# Patient Record
Sex: FEMALE | Race: OTHER | HISPANIC OR LATINO | URBAN - METROPOLITAN AREA
[De-identification: names, ages, dates, MRNs, and addresses within clinical notes are randomized per-mention and may not be internally consistent; named-entity substitution may affect disease eponyms.]

---

## 2022-05-10 ENCOUNTER — TELEPHONE ENCOUNTER (OUTPATIENT)
Dept: URBAN - METROPOLITAN AREA CLINIC 9 | Facility: CLINIC | Age: 72
End: 2022-05-10

## 2022-07-09 ENCOUNTER — TELEPHONE ENCOUNTER (OUTPATIENT)
Dept: URBAN - METROPOLITAN AREA CLINIC 121 | Facility: CLINIC | Age: 72
End: 2022-07-09

## 2022-07-09 RX ORDER — OMEPRAZOLE 20 MG/1
CAPSULE, DELAYED RELEASE ORAL ONCE A DAY
Refills: 1 | OUTPATIENT
Start: 2011-04-20 | End: 2011-04-20

## 2022-07-09 RX ORDER — OMEPRAZOLE 20 MG/1
CAPSULE, DELAYED RELEASE ORAL ONCE A DAY
Refills: 6 | OUTPATIENT
Start: 2010-01-26 | End: 2011-04-20

## 2022-07-09 RX ORDER — OMEPRAZOLE 20 MG/1
TABLET, DELAYED RELEASE ORAL
Refills: 0 | OUTPATIENT
Start: 2010-01-13 | End: 2010-07-15

## 2022-07-09 RX ORDER — METOCLOPRAMIDE 10 MG/1
TABLET ORAL
Refills: 0 | OUTPATIENT
Start: 2010-01-13 | End: 2010-07-15

## 2022-07-10 ENCOUNTER — TELEPHONE ENCOUNTER (OUTPATIENT)
Dept: URBAN - METROPOLITAN AREA CLINIC 121 | Facility: CLINIC | Age: 72
End: 2022-07-10

## 2022-07-10 RX ORDER — ALBUTEROL SULFATE 0.63 MG/3ML
SOLUTION RESPIRATORY (INHALATION)
Refills: 0 | Status: ACTIVE | COMMUNITY
Start: 2010-01-13

## 2022-07-10 RX ORDER — ALBUTEROL SULFATE 90 UG/1
AEROSOL, METERED RESPIRATORY (INHALATION)
Refills: 0 | Status: ACTIVE | COMMUNITY
Start: 2010-01-13

## 2022-07-10 RX ORDER — LITHIUM CARBONATE 450 MG/1
TABLET ORAL
Refills: 0 | Status: ACTIVE | COMMUNITY
Start: 2010-01-13

## 2022-07-10 RX ORDER — LORATADINE 5 MG
17 G IN 8 OZ WATER PO Q DAILY PRN TABLET,CHEWABLE ORAL TAKE AS DIRECTED
Refills: 2 | Status: ACTIVE | COMMUNITY
Start: 2011-02-08

## 2022-07-10 RX ORDER — MONTELUKAST SODIUM 10 MG/1
TABLET, FILM COATED ORAL
Refills: 0 | Status: ACTIVE | COMMUNITY
Start: 2010-01-13

## 2022-07-10 RX ORDER — OMEPRAZOLE 20 MG/1
CAPSULE, DELAYED RELEASE ORAL ONCE A DAY
Refills: 1 | Status: ACTIVE | COMMUNITY
Start: 2011-04-20

## 2022-07-10 RX ORDER — DOCUSATE SODIUM 100 MG/1
CAPSULE ORAL TWICE A DAY
Refills: 3 | Status: ACTIVE | COMMUNITY
Start: 2011-02-08

## 2022-07-30 ENCOUNTER — TELEPHONE ENCOUNTER (OUTPATIENT)
Age: 72
End: 2022-07-30

## 2022-07-31 ENCOUNTER — TELEPHONE ENCOUNTER (OUTPATIENT)
Age: 72
End: 2022-07-31

## 2022-09-19 ENCOUNTER — OFFICE VISIT (OUTPATIENT)
Dept: URBAN - METROPOLITAN AREA CLINIC 63 | Facility: CLINIC | Age: 72
End: 2022-09-19
Payer: COMMERCIAL

## 2022-09-19 ENCOUNTER — WEB ENCOUNTER (OUTPATIENT)
Dept: URBAN - METROPOLITAN AREA CLINIC 63 | Facility: CLINIC | Age: 72
End: 2022-09-19

## 2022-09-19 VITALS
HEIGHT: 61 IN | TEMPERATURE: 96.8 F | HEART RATE: 79 BPM | OXYGEN SATURATION: 97 % | DIASTOLIC BLOOD PRESSURE: 80 MMHG | WEIGHT: 163 LBS | BODY MASS INDEX: 30.78 KG/M2 | SYSTOLIC BLOOD PRESSURE: 130 MMHG

## 2022-09-19 DIAGNOSIS — Z12.11 SCREEN FOR COLON CANCER: ICD-10-CM

## 2022-09-19 DIAGNOSIS — R13.19 ESOPHAGEAL DYSPHAGIA: ICD-10-CM

## 2022-09-19 PROCEDURE — 99204 OFFICE O/P NEW MOD 45 MIN: CPT | Performed by: NURSE PRACTITIONER

## 2022-09-19 RX ORDER — BUDESONIDE AND FORMOTEROL FUMARATE DIHYDRATE 160; 4.5 UG/1; UG/1
2 PUFFS AEROSOL RESPIRATORY (INHALATION) TWICE A DAY
Status: ACTIVE | COMMUNITY

## 2022-09-19 RX ORDER — LITHIUM CARBONATE 450 MG/1
TABLET ORAL
Refills: 0 | Status: ACTIVE | COMMUNITY
Start: 2010-01-13

## 2022-09-19 RX ORDER — ONDANSETRON HYDROCHLORIDE 4 MG/1
1 TABLET TABLET, FILM COATED ORAL
Qty: 2 | Refills: 0 | OUTPATIENT
Start: 2022-09-19

## 2022-09-19 RX ORDER — RIVAROXABAN 20 MG/1
1 TABLET WITH FOOD TABLET, FILM COATED ORAL ONCE A DAY
Status: ACTIVE | COMMUNITY

## 2022-09-19 RX ORDER — ALBUTEROL SULFATE 0.63 MG/3ML
SOLUTION RESPIRATORY (INHALATION)
Refills: 0 | Status: ACTIVE | COMMUNITY
Start: 2010-01-13

## 2022-09-19 RX ORDER — DOCUSATE SODIUM 100 MG/1
CAPSULE ORAL TWICE A DAY
Refills: 3 | Status: ACTIVE | COMMUNITY
Start: 2011-02-08

## 2022-09-19 RX ORDER — POLYETHYLENE GLYCOL 3350, SODIUM SULFATE ANHYDROUS, SODIUM BICARBONATE, SODIUM CHLORIDE, POTASSIUM CHLORIDE 236; 22.74; 6.74; 5.86; 2.97 G/4L; G/4L; G/4L; G/4L; G/4L
AS DIRECTED POWDER, FOR SOLUTION ORAL
Qty: 4000 MILLILITER | Refills: 0 | OUTPATIENT
Start: 2022-09-19 | End: 2022-09-20

## 2022-09-19 RX ORDER — NYSTATIN 100000 [USP'U]/ML
4 ML SUSPENSION ORAL
Status: ACTIVE | COMMUNITY

## 2022-09-19 RX ORDER — OMEPRAZOLE 40 MG/1
1 CAPSULE 30 MINUTES BEFORE MORNING MEAL CAPSULE, DELAYED RELEASE ORAL ONCE A DAY
Refills: 1 | Status: ACTIVE | COMMUNITY
Start: 2011-04-20

## 2022-09-19 RX ORDER — TELMISARTAN 80 MG/1
1 TABLET TABLET ORAL ONCE A DAY
Status: ACTIVE | COMMUNITY

## 2022-09-19 NOTE — HPI-PREVIOUS PROCEDURES
EGD (5/2011) showed a large sliding type HH (6-8 cm) and h pylori neg gastritis and un remarkable duodenum and biopsies.Mild grade 2 esophagitis with intestinal metaplasia  (barretts- short segments). EGD/Colonoscopy (2/10) for evaluation of chronic reflux and intermittent rectal bleeding.Her EGD was significant for barretts negative grade 2 esophagitis and mild h pylori negative gastritis and a 5 cm hiatal hernia .Mild diverticulosis and mild proctitis with un remarkable biopsies on her colonoscopy.

## 2022-09-20 ENCOUNTER — TELEPHONE ENCOUNTER (OUTPATIENT)
Dept: URBAN - METROPOLITAN AREA CLINIC 63 | Facility: CLINIC | Age: 72
End: 2022-09-20

## 2022-09-26 ENCOUNTER — LAB OUTSIDE AN ENCOUNTER (OUTPATIENT)
Dept: URBAN - METROPOLITAN AREA CLINIC 63 | Facility: CLINIC | Age: 72
End: 2022-09-26

## 2022-10-11 ENCOUNTER — TELEPHONE ENCOUNTER (OUTPATIENT)
Dept: URBAN - METROPOLITAN AREA CLINIC 63 | Facility: CLINIC | Age: 72
End: 2022-10-11

## 2023-01-24 ENCOUNTER — TELEPHONE ENCOUNTER (OUTPATIENT)
Dept: URBAN - METROPOLITAN AREA CLINIC 63 | Facility: CLINIC | Age: 73
End: 2023-01-24

## 2023-01-27 ENCOUNTER — OFFICE VISIT (OUTPATIENT)
Dept: URBAN - METROPOLITAN AREA MEDICAL CENTER 14 | Facility: MEDICAL CENTER | Age: 73
End: 2023-01-27
Payer: COMMERCIAL

## 2023-01-27 DIAGNOSIS — K20.90 ESOPHAGITIS: ICD-10-CM

## 2023-01-27 DIAGNOSIS — K57.30 DIVERTCULOSIS OF LG INT W/O PERFORATION OR ABSCESS W/O BLEEDING: ICD-10-CM

## 2023-01-27 DIAGNOSIS — R13.19 ESOPHAGEAL DYSPHAGIA: ICD-10-CM

## 2023-01-27 DIAGNOSIS — K29.70 GASTRITIS: ICD-10-CM

## 2023-01-27 DIAGNOSIS — K21.9 ACID REFLUX: ICD-10-CM

## 2023-01-27 DIAGNOSIS — K64.3 GRADE IV HEMORRHOIDS: ICD-10-CM

## 2023-01-27 DIAGNOSIS — Z12.11 SCREEN FOR COLON CANCER: ICD-10-CM

## 2023-01-27 PROCEDURE — 45380 COLONOSCOPY AND BIOPSY: CPT | Performed by: INTERNAL MEDICINE

## 2023-01-27 PROCEDURE — 43239 EGD BIOPSY SINGLE/MULTIPLE: CPT | Performed by: INTERNAL MEDICINE

## 2023-01-30 PROBLEM — 4556007 GASTRITIS: Status: ACTIVE | Noted: 2023-01-30

## 2023-01-30 PROBLEM — 698065002 ACID REFLUX: Status: ACTIVE | Noted: 2023-01-30

## 2023-01-30 PROBLEM — 398050005 DIVERTICULAR DISEASE OF COLON: Status: ACTIVE | Noted: 2023-01-30

## 2023-01-31 ENCOUNTER — TELEPHONE ENCOUNTER (OUTPATIENT)
Dept: URBAN - METROPOLITAN AREA CLINIC 63 | Facility: CLINIC | Age: 73
End: 2023-01-31

## 2023-02-09 ENCOUNTER — LAB OUTSIDE AN ENCOUNTER (OUTPATIENT)
Dept: URBAN - METROPOLITAN AREA CLINIC 63 | Facility: CLINIC | Age: 73
End: 2023-02-09

## 2023-02-09 ENCOUNTER — TELEPHONE ENCOUNTER (OUTPATIENT)
Dept: URBAN - METROPOLITAN AREA CLINIC 63 | Facility: CLINIC | Age: 73
End: 2023-02-09

## 2023-02-15 ENCOUNTER — WEB ENCOUNTER (OUTPATIENT)
Dept: URBAN - METROPOLITAN AREA CLINIC 63 | Facility: CLINIC | Age: 73
End: 2023-02-15

## 2023-02-15 ENCOUNTER — OFFICE VISIT (OUTPATIENT)
Dept: URBAN - METROPOLITAN AREA CLINIC 63 | Facility: CLINIC | Age: 73
End: 2023-02-15
Payer: COMMERCIAL

## 2023-02-15 ENCOUNTER — LAB OUTSIDE AN ENCOUNTER (OUTPATIENT)
Dept: URBAN - METROPOLITAN AREA CLINIC 63 | Facility: CLINIC | Age: 73
End: 2023-02-15

## 2023-02-15 VITALS
TEMPERATURE: 97.6 F | WEIGHT: 169 LBS | BODY MASS INDEX: 31.91 KG/M2 | HEART RATE: 75 BPM | HEIGHT: 61 IN | OXYGEN SATURATION: 97 % | DIASTOLIC BLOOD PRESSURE: 70 MMHG | SYSTOLIC BLOOD PRESSURE: 130 MMHG

## 2023-02-15 DIAGNOSIS — R13.19 ESOPHAGEAL DYSPHAGIA: ICD-10-CM

## 2023-02-15 DIAGNOSIS — R93.5 ABNORMAL FINDINGS ON DIAGNOSTIC IMAGING OF OTHER ABDOMINAL REGIONS, INCLUDING RETROPERITONEUM: ICD-10-CM

## 2023-02-15 DIAGNOSIS — Z12.11 SCREEN FOR COLON CANCER: ICD-10-CM

## 2023-02-15 PROCEDURE — 99214 OFFICE O/P EST MOD 30 MIN: CPT | Performed by: NURSE PRACTITIONER

## 2023-02-15 RX ORDER — NYSTATIN 100000 [USP'U]/ML
4 ML SUSPENSION ORAL
Status: ACTIVE | COMMUNITY

## 2023-02-15 RX ORDER — DOCUSATE SODIUM 100 MG/1
CAPSULE ORAL TWICE A DAY
Refills: 3 | Status: ACTIVE | COMMUNITY
Start: 2011-02-08

## 2023-02-15 RX ORDER — OMEPRAZOLE 40 MG/1
1 CAPSULE 30 MINUTES BEFORE MORNING MEAL CAPSULE, DELAYED RELEASE ORAL ONCE A DAY
Refills: 1 | Status: ACTIVE | COMMUNITY
Start: 2011-04-20

## 2023-02-15 RX ORDER — TELMISARTAN 80 MG/1
1 TABLET TABLET ORAL ONCE A DAY
Status: ACTIVE | COMMUNITY

## 2023-02-15 RX ORDER — ALBUTEROL SULFATE 0.63 MG/3ML
SOLUTION RESPIRATORY (INHALATION)
Refills: 0 | Status: ACTIVE | COMMUNITY
Start: 2010-01-13

## 2023-02-15 RX ORDER — BUDESONIDE AND FORMOTEROL FUMARATE DIHYDRATE 160; 4.5 UG/1; UG/1
2 PUFFS AEROSOL RESPIRATORY (INHALATION) TWICE A DAY
Status: ACTIVE | COMMUNITY

## 2023-02-15 RX ORDER — LITHIUM CARBONATE 450 MG/1
TABLET ORAL
Refills: 0 | Status: ACTIVE | COMMUNITY
Start: 2010-01-13

## 2023-02-15 RX ORDER — RIVAROXABAN 20 MG/1
1 TABLET WITH FOOD TABLET, FILM COATED ORAL ONCE A DAY
Status: ACTIVE | COMMUNITY

## 2023-02-15 NOTE — HPI-PREVIOUS PROCEDURES
EGD January 2023 LA grade a esophagitis biopsies, no evidence of clinically significant Pittman's, tortuous esophagus, 6 cm sliding hiatal hernia, mild erosive gastritis biopsies, normal duodenum with distal duodenal diverticulum.    Colonoscopy January 2023 grade 3-4 external hemorrhoids possible thrombosis.  No inflammation.  Shallow intubation of the terminal ileum.  No polyps noted.  No inflammation noted in the entire colon.  Random biopsies Relvar prescribed colitis.  Good prep.  Pancolonic diverticulosis,  EGD (5/2011) showed a large sliding type HH (6-8 cm) and h pylori neg gastritis and un remarkable duodenum and biopsies.Mild grade 2 esophagitis with intestinal metaplasia  (barretts- short segments). EGD/Colonoscopy (2/10) for evaluation of chronic reflux and intermittent rectal bleeding.Her EGD was significant for barretts negative grade 2 esophagitis and mild h pylori negative gastritis and a 5 cm hiatal hernia .Mild diverticulosis and mild proctitis with un remarkable biopsies on her colonoscopy.

## 2023-02-15 NOTE — HPI-PREVIOUS IMAGING
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  Limited as patient could not lay prone due to back pain.  No obstruction.  Recommend CT chest to better assessment and exclude acute on chronic volvulus /stranding.  Delayed passage of the 30 mm tablet at the gastroesophageal junction.

## 2023-02-15 NOTE — HPI-TODAY'S VISIT:
72-year-old female here in f/u from egd and colonoscopy. No pathology available. Vietnamese translation by her daughter. I offered a  from office. Dysphagia worse with dry foods but occasionally liquids. She had a barium swallow which noted ?volvulus , large hiatal hernia, and delay of passage of tablet. EGD noted large hiatal hernia Taking 40mg omeprazole daily. No weight loss or loss of appetite. Admits to mainly eating soft foods. Colonoscoopy 2022 as below. She has no noticed any constipation, diarrhea, blood in stool. She takes xarelto for history of DVT Hx of COPD requiring BID rescue inhaler.

## 2023-04-13 ENCOUNTER — WEB ENCOUNTER (OUTPATIENT)
Dept: URBAN - METROPOLITAN AREA CLINIC 63 | Facility: CLINIC | Age: 73
End: 2023-04-13

## 2023-04-13 ENCOUNTER — OFFICE VISIT (OUTPATIENT)
Dept: URBAN - METROPOLITAN AREA CLINIC 63 | Facility: CLINIC | Age: 73
End: 2023-04-13
Payer: COMMERCIAL

## 2023-04-13 VITALS
HEART RATE: 87 BPM | WEIGHT: 167 LBS | OXYGEN SATURATION: 95 % | BODY MASS INDEX: 31.53 KG/M2 | SYSTOLIC BLOOD PRESSURE: 126 MMHG | HEIGHT: 61 IN | TEMPERATURE: 96.8 F | DIASTOLIC BLOOD PRESSURE: 82 MMHG

## 2023-04-13 DIAGNOSIS — Z79.01 ANTICOAGULANT LONG-TERM USE: ICD-10-CM

## 2023-04-13 DIAGNOSIS — R13.19 ESOPHAGEAL DYSPHAGIA: ICD-10-CM

## 2023-04-13 DIAGNOSIS — R91.1 LUNG NODULE SEEN ON IMAGING STUDY: ICD-10-CM

## 2023-04-13 DIAGNOSIS — K44.9 DIAPHRAGMATIC HERNIA WITHOUT OBSTRUCTION AND WITHOUT GANGRENE: ICD-10-CM

## 2023-04-13 DIAGNOSIS — R93.5 ABNORMAL FINDINGS ON DIAGNOSTIC IMAGING OF OTHER ABDOMINAL REGIONS, INCLUDING RETROPERITONEUM: ICD-10-CM

## 2023-04-13 DIAGNOSIS — K21.9 GASTROESOPHAGEAL REFLUX DISEASE WITHOUT ESOPHAGITIS: ICD-10-CM

## 2023-04-13 DIAGNOSIS — Z12.11 SCREEN FOR COLON CANCER: ICD-10-CM

## 2023-04-13 PROBLEM — 711150003: Status: ACTIVE | Noted: 2023-04-13

## 2023-04-13 PROBLEM — 786838002: Status: ACTIVE | Noted: 2023-04-13

## 2023-04-13 PROBLEM — 39839004: Status: ACTIVE | Noted: 2023-04-13

## 2023-04-13 PROCEDURE — 99214 OFFICE O/P EST MOD 30 MIN: CPT | Performed by: INTERNAL MEDICINE

## 2023-04-13 RX ORDER — TELMISARTAN 80 MG/1
1 TABLET TABLET ORAL ONCE A DAY
Status: ACTIVE | COMMUNITY

## 2023-04-13 RX ORDER — ALBUTEROL SULFATE 0.63 MG/3ML
SOLUTION RESPIRATORY (INHALATION)
Refills: 0 | Status: ACTIVE | COMMUNITY
Start: 2010-01-13

## 2023-04-13 RX ORDER — DOCUSATE SODIUM 100 MG/1
CAPSULE ORAL TWICE A DAY
Refills: 3 | Status: ACTIVE | COMMUNITY
Start: 2011-02-08

## 2023-04-13 RX ORDER — BUDESONIDE AND FORMOTEROL FUMARATE DIHYDRATE 160; 4.5 UG/1; UG/1
2 PUFFS AEROSOL RESPIRATORY (INHALATION) TWICE A DAY
Status: ACTIVE | COMMUNITY

## 2023-04-13 RX ORDER — RIVAROXABAN 20 MG/1
1 TABLET WITH FOOD TABLET, FILM COATED ORAL ONCE A DAY
Status: ACTIVE | COMMUNITY

## 2023-04-13 RX ORDER — FLUTICASONE FUROATE, UMECLIDINIUM BROMIDE AND VILANTEROL TRIFENATATE 200; 62.5; 25 UG/1; UG/1; UG/1
POWDER RESPIRATORY (INHALATION)
Qty: 60 EACH | Refills: 0 | Status: ACTIVE | COMMUNITY

## 2023-04-13 RX ORDER — LITHIUM CARBONATE 450 MG/1
TABLET ORAL
Refills: 0 | Status: ACTIVE | COMMUNITY
Start: 2010-01-13

## 2023-04-13 RX ORDER — NYSTATIN 100000 [USP'U]/ML
4 ML SUSPENSION ORAL
Status: ACTIVE | COMMUNITY

## 2023-04-13 RX ORDER — PANTOPRAZOLE 40 MG/1
TABLET, DELAYED RELEASE ORAL
Qty: 90 EACH | Refills: 0 | Status: ACTIVE | COMMUNITY

## 2023-04-13 NOTE — HPI-PREVIOUS IMAGING
CT chest without contrast February 2023: Large sliding hiatal hernia with majority of the stomach above the hemidiaphragm measuring 7.5 x 7.4 x 5.2 cm.  Cholecystectomy.  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  Limited as patient could not lay prone due to back pain.  No obstruction.  Recommend CT chest to better assessment and exclude acute on chronic volvulus /stranding.  Delayed passage of the 30 mm tablet at the gastroesophageal junction.

## 2023-04-13 NOTE — HPI-PREVIOUS PROCEDURES
EGD January 2023 LA grade a esophagitis biopsies, no evidence of clinically significant Pittman's, tortuous esophagus- biopsies unremarkable, 6 cm sliding hiatal hernia, mild erosive gastritis biopsies h pylori negative, normal duodenum with distal duodenal diverticulum.  Colonoscopy January 2023 grade 3-4 external hemorrhoids possible thrombosis.  No inflammation.  Shallow intubation of the terminal ileum.  No polyps noted.  No inflammation noted in the entire colon.  Random biopsies unremarkable.  Good prep.  Pancolonic diverticulosis,  EGD (5/2011) showed a large sliding type HH (6-8 cm) and h pylori neg gastritis and un remarkable duodenum and biopsies.Mild grade 2 esophagitis with intestinal metaplasia  (barretts- short segments). EGD/Colonoscopy (2/10) for evaluation of chronic reflux and intermittent rectal bleeding.Her EGD was significant for barretts negative grade 2 esophagitis and mild h pylori negative gastritis and a 5 cm hiatal hernia .Mild diverticulosis and mild proctitis with un remarkable biopsies on her colonoscopy.

## 2023-04-13 NOTE — HPI-TODAY'S VISIT:
Yris is here today in follow-up after her recent CAT scan for evaluation of her large hiatal hernia.  She comes accompanied today by her daughter. The CAT scan noted a large intrathoracic hiatal hernia. She continues to have symptoms of nighttime cough, epigastric and postprandial chest pain, has to drink plenty of fluids after each bite, experiences asthma-like symptoms which could be from her acid reflux. Barium swallow noted possible chronic volvulus.  No signs of obstruction.  Delayed passage of the barium tablet at the GE junction. Upper endoscopy as noted below She reports mild intermittent constipation.  Denies any rectal bleeding or melena.  Colonoscopy 2023 noted no polyps. Mongolian translation by her daughter.  Taking 40mg omeprazole daily.. Colonoscoopy 2023 as below.  She takes xarelto for history of DVT Hx of COPD requiring BID rescue inhaler.

## 2023-07-12 ENCOUNTER — DASHBOARD ENCOUNTERS (OUTPATIENT)
Age: 73
End: 2023-07-12

## 2023-07-12 PROBLEM — 428882003: Status: ACTIVE | Noted: 2023-07-12

## 2023-07-13 ENCOUNTER — OFFICE VISIT (OUTPATIENT)
Dept: URBAN - METROPOLITAN AREA CLINIC 63 | Facility: CLINIC | Age: 73
End: 2023-07-13

## 2023-07-13 RX ORDER — FLUTICASONE FUROATE, UMECLIDINIUM BROMIDE AND VILANTEROL TRIFENATATE 200; 62.5; 25 UG/1; UG/1; UG/1
POWDER RESPIRATORY (INHALATION)
Qty: 60 EACH | Refills: 0 | Status: ACTIVE | COMMUNITY

## 2023-07-13 RX ORDER — TELMISARTAN 80 MG/1
1 TABLET TABLET ORAL ONCE A DAY
Status: ACTIVE | COMMUNITY

## 2023-07-13 RX ORDER — LITHIUM CARBONATE 450 MG/1
TABLET ORAL
Refills: 0 | Status: ACTIVE | COMMUNITY
Start: 2010-01-13

## 2023-07-13 RX ORDER — BUDESONIDE AND FORMOTEROL FUMARATE DIHYDRATE 160; 4.5 UG/1; UG/1
2 PUFFS AEROSOL RESPIRATORY (INHALATION) TWICE A DAY
Status: ACTIVE | COMMUNITY

## 2023-07-13 RX ORDER — PANTOPRAZOLE 40 MG/1
TABLET, DELAYED RELEASE ORAL
Qty: 90 EACH | Refills: 0 | Status: ACTIVE | COMMUNITY

## 2023-07-13 RX ORDER — ALBUTEROL SULFATE 0.63 MG/3ML
SOLUTION RESPIRATORY (INHALATION)
Refills: 0 | Status: ACTIVE | COMMUNITY
Start: 2010-01-13

## 2023-07-13 RX ORDER — RIVAROXABAN 20 MG/1
1 TABLET WITH FOOD TABLET, FILM COATED ORAL ONCE A DAY
Status: ACTIVE | COMMUNITY

## 2023-07-13 RX ORDER — DOCUSATE SODIUM 100 MG/1
CAPSULE ORAL TWICE A DAY
Refills: 3 | Status: ACTIVE | COMMUNITY
Start: 2011-02-08

## 2023-07-13 RX ORDER — NYSTATIN 100000 [USP'U]/ML
4 ML SUSPENSION ORAL
Status: ACTIVE | COMMUNITY

## 2023-07-13 NOTE — HPI-TODAY'S VISIT:
Yris is here today in follow-up after her recent CAT scan for evaluation of her large hiatal hernia.  She comes accompanied today by her daughter. The CAT scan noted a large intrathoracic hiatal hernia. She continues to have symptoms of nighttime cough, epigastric and postprandial chest pain, has to drink plenty of fluids after each bite, experiences asthma-like symptoms which could be from her acid reflux. Barium swallow noted possible chronic volvulus.  No signs of obstruction.  Delayed passage of the barium tablet at the GE junction. Upper endoscopy as noted below She reports mild intermittent constipation.  Denies any rectal bleeding or melena.  Colonoscopy 2023 noted no polyps. Monegasque translation by her daughter.  Taking 40mg omeprazole daily.. Colonoscoopy 2023 as below.  She takes xarelto for history of DVT Hx of COPD requiring BID rescue inhaler.

## 2025-02-19 ENCOUNTER — TELEPHONE ENCOUNTER (OUTPATIENT)
Dept: URBAN - METROPOLITAN AREA CLINIC 63 | Facility: CLINIC | Age: 75
End: 2025-02-19

## 2025-02-19 ENCOUNTER — P2P PATIENT RECORD (OUTPATIENT)
Age: 75
End: 2025-02-19

## 2025-02-25 ENCOUNTER — OFFICE VISIT (OUTPATIENT)
Dept: URBAN - METROPOLITAN AREA CLINIC 63 | Facility: CLINIC | Age: 75
End: 2025-02-25

## 2025-02-25 ENCOUNTER — TELEPHONE ENCOUNTER (OUTPATIENT)
Dept: URBAN - METROPOLITAN AREA CLINIC 63 | Facility: CLINIC | Age: 75
End: 2025-02-25

## 2025-02-25 VITALS
HEIGHT: 61 IN | SYSTOLIC BLOOD PRESSURE: 120 MMHG | OXYGEN SATURATION: 97 % | WEIGHT: 166.6 LBS | TEMPERATURE: 97.4 F | BODY MASS INDEX: 31.45 KG/M2 | DIASTOLIC BLOOD PRESSURE: 72 MMHG | HEART RATE: 79 BPM

## 2025-02-25 RX ORDER — FLUTICASONE FUROATE, UMECLIDINIUM BROMIDE AND VILANTEROL TRIFENATATE 200; 62.5; 25 UG/1; UG/1; UG/1
POWDER RESPIRATORY (INHALATION)
Qty: 180 EACH | Status: ACTIVE | COMMUNITY

## 2025-02-25 RX ORDER — TELMISARTAN 80 MG/1
TABLET ORAL
Qty: 90 TABLET | Status: ACTIVE | COMMUNITY

## 2025-02-25 RX ORDER — FAMOTIDINE 40 MG/1
TABLET ORAL
Qty: 90 TABLET | Status: ACTIVE | COMMUNITY

## 2025-02-25 RX ORDER — COLLAGENASE SANTYL 250 [ARB'U]/G
OINTMENT TOPICAL
Qty: 30 GRAM | Status: ACTIVE | COMMUNITY

## 2025-02-25 RX ORDER — LEVOTHYROXINE SODIUM 75 UG/1
TABLET ORAL
Qty: 90 TABLET | Status: ACTIVE | COMMUNITY

## 2025-02-25 RX ORDER — CYCLOBENZAPRINE HYDROCHLORIDE 5 MG/1
TABLET, FILM COATED ORAL
Qty: 270 TABLET | Status: ACTIVE | COMMUNITY

## 2025-02-25 RX ORDER — ALBUTEROL SULFATE 90 UG/1
AEROSOL, METERED RESPIRATORY (INHALATION)
Qty: 8.5 GRAM | Refills: 0 | Status: ACTIVE | COMMUNITY

## 2025-02-25 RX ORDER — MELOXICAM 15 MG/1
TABLET ORAL
Qty: 30 TABLET | Status: ACTIVE | COMMUNITY

## 2025-02-25 RX ORDER — TIOTROPIUM BROMIDE INHALATION SPRAY 3.12 UG/1
SPRAY, METERED RESPIRATORY (INHALATION)
Qty: 4 GRAM | Refills: 0 | Status: ACTIVE | COMMUNITY

## 2025-02-25 RX ORDER — HYDROXYZINE HYDROCHLORIDE 25 MG/1
TABLET, FILM COATED ORAL
Qty: 90 TABLET | Status: ACTIVE | COMMUNITY

## 2025-02-25 RX ORDER — SUCRALFATE 1 G/1
TABLET ORAL
Qty: 120 TABLET | Status: ACTIVE | COMMUNITY

## 2025-02-25 RX ORDER — NYSTATIN CREAM 100000 [USP'U]/G
CREAM TOPICAL
Qty: 30 GRAM | Refills: 1 | Status: ACTIVE | COMMUNITY

## 2025-02-25 RX ORDER — RIVAROXABAN 20 MG/1
TABLET, FILM COATED ORAL
Qty: 90 EACH | Refills: 0 | Status: ACTIVE | COMMUNITY

## 2025-02-25 RX ORDER — HYDROXYZINE HYDROCHLORIDE 25 MG/1
TABLET, FILM COATED ORAL
Qty: 90 EACH | Refills: 1 | Status: ACTIVE | COMMUNITY

## 2025-02-25 RX ORDER — GABAPENTIN 300 MG/1
CAPSULE ORAL
Qty: 270 CAPSULE | Status: ACTIVE | COMMUNITY

## 2025-02-25 RX ORDER — OMEPRAZOLE 40 MG/1
CAPSULE, DELAYED RELEASE ORAL
Qty: 90 EACH | Refills: 1 | Status: ACTIVE | COMMUNITY

## 2025-02-25 RX ORDER — TRIAMCINOLONE ACETONIDE 1 MG/G
CREAM TOPICAL
Qty: 30 GRAM | Refills: 1 | Status: ACTIVE | COMMUNITY

## 2025-02-25 NOTE — HPI-PREVIOUS IMAGING
2/17/2025 barium swallow consistent with torturous esophagus with large hiatal hernia seen on recent CT 2/14/2025 CTA abdomen pelvis consistent with no high-grade stenosis aneurysm dissection or GI bleed Large hiatal hernia 2/14/2025 CT abdomen pelvis consistent with no acute findings in the abdomen or pelvis postsurgical changes from hysterectomy. CT chest without contrast February 2023: Large sliding hiatal hernia with majority of the stomach above the hemidiaphragm measuring 7.5 x 7.4 x 5.2 cm.  Cholecystectomy.  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  Limited as patient could not lay prone due to back pain.  No obstruction.  Recommend CT chest to better assessment and exclude acute on chronic volvulus /stranding.  Delayed passage of the 30 mm tablet at the gastroesophageal junction.

## 2025-02-25 NOTE — HPI-PREVIOUS LABS
2/16/2025 epic labs ccontaining majority of stomach, moderate large distal colorectal fecal burden no air fluid or obstruction consistent for WBC 4.4, RBC 3.66, hemoglobin 9.2, hematocrit 30.0, MCH 25.1, MCHC 30.7 2/17/2025 BMP significant for anion gap of 6, glucose 119, magnesium marginally low at 1.5, GFR within normal limits, creatinine without abnormality.

## 2025-02-25 NOTE — HPI-ZZZTODAY'S VISIT
Patient is a very pleasant 74-year-old female who presents for hospital follow-up.  She is a patient of Dr. Peace.  Last seen 4/13/2023.  Past medical history significant for esophageal food dysphagia, PE/DVT/IVC filter, (Xarelto), question of chronic volvulus, COPD, hypothyroidism, depression, diverticulosis, asthma, osteoarthritis, hypertension, hypothyroidism, diaphragmatic hernia, lung nodule, GERD.  Past surgical history reviewed.  Last endoscopy January 2023.  Last colonoscopy January 2023.  Recall for colonoscopy 10 years pending health concerns. Previously patient has been followed for large hiatal hernia that was symptomatic with nighttime cough, epigastric and postprandial chest pain with asthma-like symptoms.  For this reason referred to surgery for consideration of repair. Patient was admitted to [facility] 2/14/2025 through 2/18/2025 for an acute upper GI bleed.  She presented with her daughter to the ER with a chief complaint of vomiting blood and intermittent dark-colored stools with coffee-ground's onset 1 week prior and worsening within 24 hours.  She had previously been on meloxicam and steroids for recent COPD exacerbation.  CTA abdomen pelvis demonstrating no active hemorrhage.  Lab work with hemoglobin 9.4.  Previous labs 10.9 in July 2024.  Hemodynamically stable,  with no signs of active GI bleed or  ulcer noted.  Large hiatal hernia appreciated.  PPI IV, general surgery consulted as well as vascular surgery.  Dr. Shetty planning on surgical correction outpatient.  Patient presents for hospital follow-up with her daughter.  Patient is Polish-speaking and her daughter is providing translation today.  She relays that she has had no further symptoms of hematochezia, hematemesis or GI bleed.  She has discontinued meloxicam and steroids as previously suggested by the ER.  She is on her Xarelto daily again.  She has seen Dr. Zamudio who they state is not advising that she proceed with her hiatal hernia repair.  She has an appointment with Dr. Shetty next week to discuss the repair of the hiatal hernia.  From a symptom perspective the patient continues to have her chronic esophageal dysphagia and acid reflux but no changes from a stomach perspective.  Upon further questioning she does admit that she had epistaxis prior to her hospitalization on the 14th.  This is not something that she mentioned at the time of her hospitalization because she did not think it was relevant.  She upon further questioning also admitted she just prior to the episode of hematemesis she had blown her nose rather forcefully.  Of note Dr. Zamudio ordered repeat labs for her anemia last week.  He also has repeat labs ordered for April for her anemia as well.  She admits to dizziness but this has been improving since her hospitalization.  She denies dyspepsia, pyrosis, unintentional weight loss, change in bowel habits, melena, hematochezia. She deneies fatigue, dyspnea, nausea, vomiting, early satiety, fever, chills.

## 2025-03-15 ENCOUNTER — TELEPHONE ENCOUNTER (OUTPATIENT)
Dept: URBAN - METROPOLITAN AREA CLINIC 63 | Facility: CLINIC | Age: 75
End: 2025-03-15

## 2025-03-18 ENCOUNTER — TELEPHONE ENCOUNTER (OUTPATIENT)
Dept: URBAN - METROPOLITAN AREA CLINIC 63 | Facility: CLINIC | Age: 75
End: 2025-03-18

## 2025-03-18 ENCOUNTER — OFFICE VISIT (OUTPATIENT)
Dept: URBAN - METROPOLITAN AREA CLINIC 63 | Facility: CLINIC | Age: 75
End: 2025-03-18
Payer: COMMERCIAL

## 2025-03-18 VITALS
OXYGEN SATURATION: 96 % | HEART RATE: 73 BPM | WEIGHT: 168.2 LBS | DIASTOLIC BLOOD PRESSURE: 84 MMHG | HEIGHT: 61 IN | BODY MASS INDEX: 31.75 KG/M2 | SYSTOLIC BLOOD PRESSURE: 128 MMHG | TEMPERATURE: 97.4 F

## 2025-03-18 DIAGNOSIS — R91.1 LUNG NODULE SEEN ON IMAGING STUDY: ICD-10-CM

## 2025-03-18 DIAGNOSIS — Z90.49 STATUS POST CHOLECYSTECTOMY: ICD-10-CM

## 2025-03-18 DIAGNOSIS — Z79.01 ANTICOAGULANT LONG-TERM USE: ICD-10-CM

## 2025-03-18 DIAGNOSIS — K44.9 DIAPHRAGMATIC HERNIA WITHOUT OBSTRUCTION AND WITHOUT GANGRENE: ICD-10-CM

## 2025-03-18 DIAGNOSIS — K21.9 GASTROESOPHAGEAL REFLUX DISEASE WITHOUT ESOPHAGITIS: ICD-10-CM

## 2025-03-18 DIAGNOSIS — Z12.11 SCREEN FOR COLON CANCER: ICD-10-CM

## 2025-03-18 DIAGNOSIS — Z87.19 HISTORY OF HEMATEMESIS: ICD-10-CM

## 2025-03-18 DIAGNOSIS — R13.19 ESOPHAGEAL DYSPHAGIA: ICD-10-CM

## 2025-03-18 DIAGNOSIS — R04.0 EPISTAXIS: ICD-10-CM

## 2025-03-18 PROCEDURE — 99214 OFFICE O/P EST MOD 30 MIN: CPT

## 2025-03-18 RX ORDER — MELOXICAM 15 MG/1
TABLET ORAL
Qty: 30 TABLET | Status: ACTIVE | COMMUNITY

## 2025-03-18 RX ORDER — SUCRALFATE 1 G/1
TABLET ORAL
Qty: 120 TABLET | Status: ACTIVE | COMMUNITY

## 2025-03-18 RX ORDER — TRIAMCINOLONE ACETONIDE 1 MG/G
CREAM TOPICAL
Qty: 30 GRAM | Refills: 1 | Status: ACTIVE | COMMUNITY

## 2025-03-18 RX ORDER — HYDROXYZINE HYDROCHLORIDE 25 MG/1
TABLET, FILM COATED ORAL
Qty: 90 TABLET | Status: ACTIVE | COMMUNITY

## 2025-03-18 RX ORDER — FLUTICASONE FUROATE, UMECLIDINIUM BROMIDE AND VILANTEROL TRIFENATATE 200; 62.5; 25 UG/1; UG/1; UG/1
POWDER RESPIRATORY (INHALATION)
Qty: 180 EACH | Status: ACTIVE | COMMUNITY

## 2025-03-18 RX ORDER — TIOTROPIUM BROMIDE INHALATION SPRAY 3.12 UG/1
SPRAY, METERED RESPIRATORY (INHALATION)
Qty: 4 GRAM | Refills: 0 | Status: ACTIVE | COMMUNITY

## 2025-03-18 RX ORDER — OMEPRAZOLE 40 MG/1
CAPSULE, DELAYED RELEASE ORAL
Qty: 90 EACH | Refills: 1 | Status: ACTIVE | COMMUNITY

## 2025-03-18 RX ORDER — COLLAGENASE SANTYL 250 [ARB'U]/G
OINTMENT TOPICAL
Qty: 30 GRAM | Status: ACTIVE | COMMUNITY

## 2025-03-18 RX ORDER — NYSTATIN CREAM 100000 [USP'U]/G
CREAM TOPICAL
Qty: 30 GRAM | Refills: 1 | Status: ACTIVE | COMMUNITY

## 2025-03-18 RX ORDER — ALBUTEROL SULFATE 90 UG/1
AEROSOL, METERED RESPIRATORY (INHALATION)
Qty: 8.5 GRAM | Refills: 0 | Status: ACTIVE | COMMUNITY

## 2025-03-18 RX ORDER — LEVOTHYROXINE SODIUM 75 UG/1
TABLET ORAL
Qty: 90 TABLET | Status: ACTIVE | COMMUNITY

## 2025-03-18 RX ORDER — RIVAROXABAN 20 MG/1
TABLET, FILM COATED ORAL
Qty: 90 EACH | Refills: 0 | Status: ACTIVE | COMMUNITY

## 2025-03-18 RX ORDER — HYDROXYZINE HYDROCHLORIDE 25 MG/1
TABLET, FILM COATED ORAL
Qty: 90 EACH | Refills: 1 | Status: ACTIVE | COMMUNITY

## 2025-03-18 RX ORDER — GABAPENTIN 300 MG/1
CAPSULE ORAL
Qty: 270 CAPSULE | Status: ACTIVE | COMMUNITY

## 2025-03-18 RX ORDER — FAMOTIDINE 40 MG/1
TABLET ORAL
Qty: 90 TABLET | Status: ACTIVE | COMMUNITY

## 2025-03-18 RX ORDER — TELMISARTAN 80 MG/1
TABLET ORAL
Qty: 90 TABLET | Status: ACTIVE | COMMUNITY

## 2025-03-18 RX ORDER — CYCLOBENZAPRINE HYDROCHLORIDE 5 MG/1
TABLET, FILM COATED ORAL
Qty: 270 TABLET | Status: ACTIVE | COMMUNITY

## 2025-03-18 NOTE — HPI-PREVIOUS PROCEDURES
Endoscopy 2/16/2025 with Dr. Thurman consistent with large hiatal hernia Erythematous inflammatory changes within hernia very mild no evidence of [place] ulcer agitation or stigmata of recent or current bleeding Normal stomach Pylorus intubated and duodenum within normal limits EGD January 2023 LA grade a esophagitis biopsies, no evidence of clinically significant Pittman's, tortuous esophagus- biopsies unremarkable, 6 cm sliding hiatal hernia, mild erosive gastritis biopsies h pylori negative, normal duodenum with distal duodenal diverticulum.  Colonoscopy January 2023 grade 3-4 external hemorrhoids possible thrombosis.  No inflammation.  Shallow intubation of the terminal ileum.  No polyps noted.  No inflammation noted in the entire colon.  Random biopsies unremarkable.  Good prep.  Pancolonic diverticulosis,  EGD (5/2011) showed a large sliding type HH (6-8 cm) and h pylori neg gastritis and un remarkable duodenum and biopsies.Mild grade 2 esophagitis with intestinal metaplasia  (barretts- short segments).  EGD/Colonoscopy (2/10) for evaluation of chronic reflux and intermittent rectal bleeding.Her EGD was significant for barretts negative grade 2 esophagitis and mild h pylori negative gastritis and a 5 cm hiatal hernia .Mild diverticulosis and mild proctitis with un remarkable biopsies on her colonoscopy.

## 2025-03-18 NOTE — HPI-ZZZTODAY'S VISIT
Patient is a very pleasant 74-year-old female presents for follow-up.  She is a patient of Dr. Peace.  I last saw her 2/25/2025.  Last endoscopy February 16, 2025 inpatient for acute upper GI bleed.  Last colonoscopy January 2023. Last endoscopy February 2025. Past medical history significant for esophageal food dysphagia, PE/DVT/IVC filter, (Xarelto), question of chronic volvulus, COPD, hypothyroidism, depression, diverticulosis, asthma, osteoarthritis, hypertension, hypothyroidism, diaphragmatic hernia, lung nodule, GERD.  Past surgical history reviewed.   Previously patient has been followed for large hiatal hernia that was symptomatic with nighttime cough, epigastric and postprandial chest pain with asthma-like symptoms.  For this reason referred to surgery for consideration of repair. Patient was admitted to [facility] 2/14/2025 through 2/18/2025 for an acute upper GI bleed.  She presented with her daughter to the ER with a chief complaint of vomiting blood and intermittent dark-colored stools with coffee-ground's onset 1 week prior and worsening within 24 hours.  She had previously been on meloxicam and steroids for recent COPD exacerbation.  CTA abdomen pelvis demonstrating no active hemorrhage.  Lab work with hemoglobin 9.4.  Previous labs 10.9 in July 2024.  Hemodynamically stable, EGD 2/16/2025 with no signs of active GI bleed or  ulcer noted.  Large hiatal hernia appreciated.  PPI IV, general surgery consulted as well as vascular surgery.  Dr. Shetty planning on surgical correction outpatient.  Last OV:  "Patient presents for hospital follow-up with her daughter.  Patient is Austrian-speaking and her daughter is providing translation today.  She relays that she has had no further symptoms of hematochezia, hematemesis or GI bleed.  She has discontinued meloxicam and steroids as previously suggested by the ER.  She is on her Xarelto daily again.  She has seen Dr. Zamudio who they state is not advising that she proceed with her hiatal hernia repair.  She has an appointment with Dr. Shetty next week to discuss the repair of the hiatal hernia.  From a symptom perspective the patient continues to have her chronic esophageal dysphagia and acid reflux but no changes from a stomach perspective.  Upon further questioning she does admit that she had epistaxis prior to her hospitalization on the 14th.  This is not something that she mentioned at the time of her hospitalization because she did not think it was relevant.  She upon further questioning also admitted she just prior to the episode of hematemesis she had blown her nose rather forcefully.  Of note Dr. Zamudio ordered repeat labs for her anemia last week.  He also has repeat labs ordered for April for her anemia as well.  She admits to dizziness but this has been improving since her hospitalization."  Today 03/18/2025 Patient presents for follow up with her daughter who again is translating. She is continuing to be lethargic and tired. She continues to take oral iron.  She admits she is constipated on occasion.  She is well-managed with MiraLAX in conjunction with iron She had repeat blood work this morning with Dr. Zamudio. Patient admits that after discussion with Dr. Shetty and outpatient follow-up she is decided to go through with hernia repair. She denies dysphagia,  pyrosis, abdominal pain, change in bowel habits, unintentional weight loss, melena, or hematochezia.

## 2025-05-28 ENCOUNTER — OFFICE VISIT (OUTPATIENT)
Dept: URBAN - METROPOLITAN AREA CLINIC 63 | Facility: CLINIC | Age: 75
End: 2025-05-28